# Patient Record
Sex: FEMALE | Race: WHITE
[De-identification: names, ages, dates, MRNs, and addresses within clinical notes are randomized per-mention and may not be internally consistent; named-entity substitution may affect disease eponyms.]

---

## 2018-05-05 VITALS — SYSTOLIC BLOOD PRESSURE: 121 MMHG | DIASTOLIC BLOOD PRESSURE: 64 MMHG

## 2021-05-25 ENCOUNTER — HOSPITAL ENCOUNTER (OUTPATIENT)
Dept: HOSPITAL 63 - PMG | Age: 51
End: 2021-05-25
Attending: NURSE PRACTITIONER
Payer: OTHER GOVERNMENT

## 2021-05-25 DIAGNOSIS — M25.512: Primary | ICD-10-CM

## 2021-05-25 PROCEDURE — 73030 X-RAY EXAM OF SHOULDER: CPT

## 2021-05-25 NOTE — RAD
Exam: Left shoulder 3 views



INDICATION: Left shoulder pain



TECHNIQUE: Frontal view of the left shoulder with internal and external rotation and transscapular Y 
views



Comparisons: None



FINDINGS:

Bone mineralization is normal. There is calcific density at the area of the supraspinatus tendon. No 
acute or healed fractures. Joint spaces are well-maintained.



IMPRESSION:

Findings likely representing calcific tendinitis at the supraspinatus tendon. Patient may benefit fro
m aspiration/lavage. No acute fracture identified.



Electronically signed by: Javon Timmons MD (5/25/2021 7:13 PM) [facility]